# Patient Record
Sex: FEMALE | Race: ASIAN | URBAN - METROPOLITAN AREA
[De-identification: names, ages, dates, MRNs, and addresses within clinical notes are randomized per-mention and may not be internally consistent; named-entity substitution may affect disease eponyms.]

---

## 2019-07-21 ENCOUNTER — OFFICE VISIT (OUTPATIENT)
Dept: URGENT CARE | Facility: URGENT CARE | Age: 34
End: 2019-07-21
Payer: COMMERCIAL

## 2019-07-21 VITALS
SYSTOLIC BLOOD PRESSURE: 97 MMHG | DIASTOLIC BLOOD PRESSURE: 63 MMHG | RESPIRATION RATE: 17 BRPM | OXYGEN SATURATION: 97 % | HEIGHT: 67 IN | TEMPERATURE: 97.9 F | HEART RATE: 75 BPM | WEIGHT: 149.56 LBS | BODY MASS INDEX: 23.47 KG/M2

## 2019-07-21 DIAGNOSIS — J06.9 VIRAL URI WITH COUGH: Primary | ICD-10-CM

## 2019-07-21 PROCEDURE — 99203 OFFICE O/P NEW LOW 30 MIN: CPT | Performed by: FAMILY MEDICINE

## 2019-07-21 RX ORDER — BENZONATATE 100 MG/1
100 CAPSULE ORAL 3 TIMES DAILY PRN
Qty: 21 CAPSULE | Refills: 1 | Status: SHIPPED | OUTPATIENT
Start: 2019-07-21

## 2019-07-21 ASSESSMENT — MIFFLIN-ST. JEOR: SCORE: 1416.04

## 2019-07-21 NOTE — PROGRESS NOTES
"Subjective:   Orville Chase is a 34 year old female who presents for   Chief Complaint   Patient presents with     Urgent Care     persistent cough and difficulty breathing for two days.      2 days of cough with sputum production. Tried mucinex medication but not helping much. Interfering with her sleep, things get worse while laying down.   Denies any fevers. No other sick contacts.     No hx of asthma  Non-smoker    SH:  for [employer]    There are no active problems to display for this patient.    Current Outpatient Medications   Medication     benzonatate (TESSALON) 100 MG capsule     Dextromethorphan-guaiFENesin (MUCINEX DM MAXIMUM STRENGTH PO)     No current facility-administered medications for this visit.      ROS:  As above per HPI    Objective:   BP 97/63   Pulse 75   Temp 97.9  F (36.6  C) (Oral)   Resp 17   Ht 1.702 m (5' 7\")   Wt 67.8 kg (149 lb 9 oz)   SpO2 97%   BMI 23.42 kg/m  , Body mass index is 23.42 kg/m .  Gen:  NAD, well-nourished, sitting in chair comfortably, very pleasant  HEENT: EOMI, sclera anicteric, Head normocephalic, ; nares patent; moist mucous membranes  Neck: trachea midline, no thyromegaly  CV:  Hemodynamically stable, RRR  Pulm:  no increased work of breathing , CTAB, no wheezes/rales/rhonchi   Extrem: no cyanosis, edema or clubbing  Skin: no obvious rashes or abnormalities  Psych: Euthymic, linear thoughts, normal rate of speech    No results found for this or any previous visit.    Assessment & Plan:   Orville Chase, 34 year old female who presents with:    Viral URI with cough  No desaturation, fevers, or crackles- cXR deferred at this time as suspicions is low for pneumonia. Combination of mucinex, honey, and tessalon recommended for the cough.     Work not provided stating if not feeling better in 2 days or has a fever should avoid flying as to not get others sick  - benzonatate (TESSALON) 100 MG capsule  Dispense: 21 capsule; Refill: 1      Des Guevara MD "   [facility]    The use of [employer] dictation services may have been used to construct the content in this note; any grammatical or spelling errors are non-intentional. Please contact the author of this note directly if you are in need of any clarification.

## 2019-07-21 NOTE — LETTER
7/21/2019     Orville Chase  [address]   [place] [address]      To [employer],     Orville was seen today for a respiratory illness, hopefully she is feeling better in the next couple days so that she can work effectively. If she has a fever or if the cough is not better managed I would recommend she not travel as to not get other passengers/employees sick.       Sincerely,    Des Guevara MD  [facility]  [address] [address]  Phone: [phone]

## 2019-07-21 NOTE — PATIENT INSTRUCTIONS
Mucinex every 4-6 hours as needed for cough  - honey in warm water can also be helpful  - tessalon every 8 hours as needed    Drink 4-6 water bottles a day to stay hydrated      If you have pain with breathing, rapid breathing, dizziness, or develop a fever return immediately to be seen